# Patient Record
(demographics unavailable — no encounter records)

---

## 2025-01-22 NOTE — HISTORY OF PRESENT ILLNESS
[de-identified] : Patient 59-year-old right-hand-dominant male presents with a 9-day history of pain in his left elbow on the medial side with numbness and tingling to his left hand.  Patient states it started after lunch but there was no specific injury.  Patient was concerned and saw his cardiologist who did a workup on him he also went to [facility] emergency room.  He was given prednisone and naproxen which he says has helped some.  He says the numbness and tingling is intermittent.

## 2025-01-22 NOTE — ASSESSMENT
[FreeTextEntry1] : Left elbow cubital tunnel syndrome  Plan I did recommend the patient continue the steroids and finish them out.  When he finishes the steroid he can go back on the naproxen.  I did advise him to use with GI precautions.  I did advise him he can try vitamin B6 and avoid leaning on the elbow or banging the elbow.  He will be seen back in 3 to 4 weeks.  If he continues to have symptoms he would require EMG/NCV testing.  At this point is feeling much better since has been on the steroid.  He will finish out the steroid and then start the naproxen again.  Avoid leaning on the elbow and try the vitamin B6.  He will follow-up with his cardiologist just to get checked and be seen back in 3 weeks.  If his symptoms get worse again or recur he would be sent for the nerve testing.  All questions were answered is agreeable with the plan.  Patient was advised if they develop any severe pain, more numbness or tingling or pain with range of motion to the fingers they must call or go to the emergency room immediately. All the signs and symptoms of compartment syndrome were explained.  The patient understands.  This medical record was created utilizing Dragon voice recognition software.  This software is not perfect and may occasionally create typographical errors which may be reflected in the above medical record.

## 2025-01-22 NOTE — IMAGING
[Left] : left elbow [de-identified] : He is alert and oriented vital signs are stable  Examination left elbow reveals no swelling.  He is tenderness around the medial epicondyle.  He is full range of motion of the elbow.  He has a positive Tinel sign at the left elbow at the cubital tunnel with a negative elbow flexion test.  He has a flexor digitorum profundus strength testing to the left ring and small fingers of 5/5 first dorsal interosseous and interosseous of 5/5 with a negative Wartenberg's sign negative Froment's sign.  He is able to cross his fingers.  He has a normal vascular exam Normal skin exam. No evidence for open wounds infection or compartment syndrome. [FreeTextEntry1] : X-rays left elbow 3 views revealed no fracture or dislocations.

## 2025-03-03 NOTE — HISTORY OF PRESENT ILLNESS
[Left Arm] : left arm [Gradual] : gradual [0] : 0 [2] : 2 [Tightness] : tightness [Occasional] : occasional [Household chores] : household chores [Leisure] : leisure [Work] : work [Rest] : rest [Meds] : meds [Full time] : Work status: full time [de-identified] : 59 year old male presents LT elbow pain. Pain radiates down to the fingers. Pt went to [facility]. He received a shot of tramadol & oral prednisone. He met with , had X rays. He reports improvement.   Occup:   [] : Post Surgical Visit: no [FreeTextEntry1] : left elbow pain [FreeTextEntry3] : 1/10/25 [FreeTextEntry5] : no specific MOI_ gradual onset of symptoms that got worse with time [FreeTextEntry9] : naproxen and adbvil help with symptoms [de-identified] : XR [facility] 1/22/25 [de-identified] : patient denies any pain since last visit with quintin. reports tightness with HEP in gym, but no pain with ADLs or therex

## 2025-03-03 NOTE — DISCUSSION/SUMMARY
[de-identified] : Discussed the nature of the diagnosis and risk and benefits of different modalities of treatment. LT CuTS  He will avoid prolonged flexion  RTO 4 weeks

## 2025-03-03 NOTE — DISCUSSION/SUMMARY
[de-identified] : Discussed the nature of the diagnosis and risk and benefits of different modalities of treatment. LT CuTS  He will avoid prolonged flexion  RTO 4 weeks

## 2025-03-03 NOTE — HISTORY OF PRESENT ILLNESS
[Left Arm] : left arm [Gradual] : gradual [0] : 0 [2] : 2 [Tightness] : tightness [Occasional] : occasional [Household chores] : household chores [Leisure] : leisure [Work] : work [Rest] : rest [Meds] : meds [Full time] : Work status: full time [de-identified] : 59 year old male presents LT elbow pain. Pain radiates down to the fingers. Pt went to [facility]. He received a shot of tramadol & oral prednisone. He met with , had X rays. He reports improvement.   Occup:   [] : Post Surgical Visit: no [FreeTextEntry1] : left elbow pain [FreeTextEntry3] : 1/10/25 [FreeTextEntry5] : no specific MOI_ gradual onset of symptoms that got worse with time [FreeTextEntry9] : naproxen and adbvil help with symptoms [de-identified] : XR [facility] 1/22/25 [de-identified] : patient denies any pain since last visit with quintin. reports tightness with HEP in gym, but no pain with ADLs or therex

## 2025-03-31 NOTE — HISTORY OF PRESENT ILLNESS
[Leisure] : leisure [Rest] : rest [Meds] : meds [1] : 2 [0] : 0 [de-identified] : 59 year old male followed for LT CuTs. He has been avoiding prolonged flexion. Feeling much better.   Occup:   [] : no [FreeTextEntry1] : Left elbow [de-identified] : N/A [de-identified] : xray [facility]

## 2025-03-31 NOTE — DISCUSSION/SUMMARY
[de-identified] : Discussed the nature of the diagnosis and risk and benefits of different modalities of treatment. LT CuTS  He will avoid prolonged flexion as needed. Return criteria discussed Feeling better.

## 2025-03-31 NOTE — PHYSICAL EXAM
[Left] : left elbow [NL (150)] : flexion 150 degrees [NL (0)] : extension 0 degrees [] : negative tinel's